# Patient Record
Sex: FEMALE | Race: WHITE | NOT HISPANIC OR LATINO | URBAN - METROPOLITAN AREA
[De-identification: names, ages, dates, MRNs, and addresses within clinical notes are randomized per-mention and may not be internally consistent; named-entity substitution may affect disease eponyms.]

---

## 2022-04-04 ENCOUNTER — OFFICE VISIT (OUTPATIENT)
Dept: URBAN - METROPOLITAN AREA CLINIC 10 | Facility: CLINIC | Age: 72
End: 2022-04-04
Payer: MEDICARE

## 2022-04-04 DIAGNOSIS — H52.4 PRESBYOPIA: ICD-10-CM

## 2022-04-04 DIAGNOSIS — H16.223 KERATOCONJUNCTIVITIS SICCA, BILATERAL: ICD-10-CM

## 2022-04-04 DIAGNOSIS — H40.1134 PRIMARY OPEN-ANGLE GLAUCOMA, INDETERMINATE, BILATERAL: Primary | ICD-10-CM

## 2022-04-04 PROCEDURE — 92134 CPTRZ OPH DX IMG PST SGM RTA: CPT | Performed by: STUDENT IN AN ORGANIZED HEALTH CARE EDUCATION/TRAINING PROGRAM

## 2022-04-04 PROCEDURE — 99204 OFFICE O/P NEW MOD 45 MIN: CPT | Performed by: STUDENT IN AN ORGANIZED HEALTH CARE EDUCATION/TRAINING PROGRAM

## 2022-04-04 RX ORDER — LATANOPROST 50 UG/ML
0.005 % SOLUTION OPHTHALMIC
Qty: 10 | Refills: 4 | Status: ACTIVE
Start: 2022-04-04

## 2022-04-04 ASSESSMENT — VISUAL ACUITY
OD: 20/20
OS: 20/20

## 2022-04-04 ASSESSMENT — KERATOMETRY
OS: 42.00
OD: 42.00

## 2022-04-04 ASSESSMENT — INTRAOCULAR PRESSURE
OD: 12
OS: 12

## 2022-04-04 NOTE — IMPRESSION/PLAN
Impression: Primary open-angle glaucoma, indeterminate, bilateral: H40.8794. Plan: Diagnosed and started treatment on the outside IOP's 12/12 today RTC 6mo for 24-2HVF/RNFL/IOP

## 2022-04-04 NOTE — IMPRESSION/PLAN
Impression: Keratoconjunctivitis sicca, bilateral: C26.893. Plan: Patient educated on signs and symptoms of dry eye and importance of environmental factors. Discussed using OTC AT's with patient QID OU long-term, Gel drop QHS OU, and  warm compresses BID 5-10min OU long-term. Educated patient if signs or symptoms worsen to RTC for dry eye work-up.

## 2022-09-01 ENCOUNTER — OFFICE VISIT (OUTPATIENT)
Dept: URBAN - METROPOLITAN AREA CLINIC 10 | Facility: CLINIC | Age: 72
End: 2022-09-01
Payer: COMMERCIAL

## 2022-09-01 DIAGNOSIS — H52.4 PRESBYOPIA: ICD-10-CM

## 2022-09-01 DIAGNOSIS — H40.1131 PRIMARY OPEN-ANGLE GLAUCOMA, MILD STAGE, BILATERAL: Primary | ICD-10-CM

## 2022-09-01 PROCEDURE — 99213 OFFICE O/P EST LOW 20 MIN: CPT | Performed by: STUDENT IN AN ORGANIZED HEALTH CARE EDUCATION/TRAINING PROGRAM

## 2022-09-01 PROCEDURE — 92133 CPTRZD OPH DX IMG PST SGM ON: CPT | Performed by: STUDENT IN AN ORGANIZED HEALTH CARE EDUCATION/TRAINING PROGRAM

## 2022-09-01 PROCEDURE — 92083 EXTENDED VISUAL FIELD XM: CPT | Performed by: STUDENT IN AN ORGANIZED HEALTH CARE EDUCATION/TRAINING PROGRAM

## 2022-09-01 ASSESSMENT — INTRAOCULAR PRESSURE
OS: 13
OD: 13